# Patient Record
Sex: FEMALE | Race: WHITE | Employment: UNEMPLOYED | URBAN - METROPOLITAN AREA
[De-identification: names, ages, dates, MRNs, and addresses within clinical notes are randomized per-mention and may not be internally consistent; named-entity substitution may affect disease eponyms.]

---

## 2021-10-13 ENCOUNTER — HOSPITAL ENCOUNTER (OUTPATIENT)
Age: 61
Discharge: HOME | End: 2021-10-13
Payer: COMMERCIAL

## 2021-10-13 VITALS
RESPIRATION RATE: 16 BRPM | SYSTOLIC BLOOD PRESSURE: 149 MMHG | TEMPERATURE: 99 F | OXYGEN SATURATION: 98 % | HEIGHT: 68 IN | WEIGHT: 170 LBS | BODY MASS INDEX: 25.76 KG/M2 | HEART RATE: 103 BPM | DIASTOLIC BLOOD PRESSURE: 82 MMHG

## 2021-10-13 DIAGNOSIS — N30.01 ACUTE CYSTITIS WITH HEMATURIA: Primary | ICD-10-CM

## 2021-10-13 PROCEDURE — 87086 URINE CULTURE/COLONY COUNT: CPT | Performed by: NURSE PRACTITIONER

## 2021-10-13 PROCEDURE — 81002 URINALYSIS NONAUTO W/O SCOPE: CPT | Performed by: NURSE PRACTITIONER

## 2021-10-13 PROCEDURE — 99203 OFFICE O/P NEW LOW 30 MIN: CPT | Performed by: NURSE PRACTITIONER

## 2021-10-13 PROCEDURE — 87077 CULTURE AEROBIC IDENTIFY: CPT | Performed by: NURSE PRACTITIONER

## 2021-10-13 RX ORDER — CEPHALEXIN 500 MG/1
500 CAPSULE ORAL 2 TIMES DAILY
Qty: 14 CAPSULE | Refills: 0 | Status: SHIPPED | OUTPATIENT
Start: 2021-10-13 | End: 2021-10-20

## 2021-10-13 NOTE — ED PROVIDER NOTES
Patient Seen in: [facility] [address] [place] [address]      History   Patient presents with:  Urinary Symptoms  Tingling    Stated Complaint: uti symptoms tingling in hands    Subjective:   27-year-old female presents with complaints of urinary frequency urgency and dys Pulmonary:      Effort: Pulmonary effort is normal.   Abdominal:      General: Bowel sounds are normal.      Tenderness: There is no abdominal tenderness. There is no guarding. Skin:     General: Skin is warm and dry.    Neurological:      Mental Status

## 2021-10-14 NOTE — ED NOTES
Pharmacy called and states that \"Mikala states she is not wanting to start antibiotics due to possible reaction\"  [facility] aware and instructed Pharmacy to DC medications and inform Pt to take OTC AZO until culture and sensitivity comes back.   Pharmacist agr

## 2021-10-14 NOTE — ED NOTES
Pt called and states that she started the medications from [facility] and is having SOB. Pt upset that [facility] would have you not be on an ABX for her symptoms. RN explained the importance of going to ER with SOB and to stop the medications.   Pt requesting to know